# Patient Record
Sex: FEMALE | Race: WHITE | URBAN - METROPOLITAN AREA
[De-identification: names, ages, dates, MRNs, and addresses within clinical notes are randomized per-mention and may not be internally consistent; named-entity substitution may affect disease eponyms.]

---

## 2021-07-26 ENCOUNTER — LAB OUTSIDE AN ENCOUNTER (OUTPATIENT)
Dept: URBAN - METROPOLITAN AREA CLINIC 12 | Facility: CLINIC | Age: 61
End: 2021-07-26

## 2021-07-26 ENCOUNTER — DASHBOARD ENCOUNTERS (OUTPATIENT)
Age: 61
End: 2021-07-26

## 2021-07-26 ENCOUNTER — WEB ENCOUNTER (OUTPATIENT)
Dept: URBAN - METROPOLITAN AREA CLINIC 12 | Facility: CLINIC | Age: 61
End: 2021-07-26

## 2021-07-26 ENCOUNTER — OFFICE VISIT (OUTPATIENT)
Dept: URBAN - METROPOLITAN AREA CLINIC 12 | Facility: CLINIC | Age: 61
End: 2021-07-26
Payer: COMMERCIAL

## 2021-07-26 VITALS
DIASTOLIC BLOOD PRESSURE: 68 MMHG | TEMPERATURE: 96.8 F | HEIGHT: 67 IN | WEIGHT: 186.8 LBS | HEART RATE: 80 BPM | SYSTOLIC BLOOD PRESSURE: 107 MMHG | BODY MASS INDEX: 29.32 KG/M2

## 2021-07-26 DIAGNOSIS — Z12.11 COLON CANCER SCREENING: ICD-10-CM

## 2021-07-26 PROCEDURE — 99202 OFFICE O/P NEW SF 15 MIN: CPT | Performed by: STUDENT IN AN ORGANIZED HEALTH CARE EDUCATION/TRAINING PROGRAM

## 2021-07-26 RX ORDER — SODIUM SULFATE, MAGNESIUM SULFATE, AND POTASSIUM CHLORIDE 17.75; 2.7; 2.25 G/1; G/1; G/1
12 TABLETS TABLET ORAL
Qty: 24 TABLET | Refills: 0 | OUTPATIENT
Start: 2021-07-26 | End: 2021-07-27

## 2021-07-26 RX ORDER — LEVOTHYROXINE SODIUM 100 UG/1
1 TABLET IN THE MORNING ON AN EMPTY STOMACH TABLET ORAL ONCE A DAY
Status: ACTIVE | COMMUNITY

## 2021-07-26 NOTE — HPI-TODAY'S VISIT:
59 yo F here for evaluation prior to colonoscopy.  No prior colonoscopy.  She says that sometimes she will go 5-6 days without a bowel movement, which has been her baseline for her entire life. She denies sensation of constipation. Denies abdominal pain. Denies rectal bleeding. No known anemia.  No FH of CRC.

## 2021-08-24 ENCOUNTER — TELEPHONE ENCOUNTER (OUTPATIENT)
Dept: URBAN - METROPOLITAN AREA CLINIC 92 | Facility: CLINIC | Age: 61
End: 2021-08-24

## 2021-08-24 ENCOUNTER — OFFICE VISIT (OUTPATIENT)
Dept: URBAN - METROPOLITAN AREA LAB 3 | Facility: LAB | Age: 61
End: 2021-08-24
Payer: COMMERCIAL

## 2021-08-24 DIAGNOSIS — Z53.8 FAILED ATTEMPTED SURGICAL PROCEDURE: ICD-10-CM

## 2021-08-24 DIAGNOSIS — Z12.11 AVERAGE RISK FOR CRC. DUE TO PT'S CO-MORBID STATE WITH END STAGE DEMENTIA, HIGH RISK FOR ANESTHESIA (PER NEUROLOGY); INABILITY TO TAKE A BOWEL PREP....WOULD NOT ADVISE ANY COLORECTAL CANCER SCREENING INCLUDING STOOL TEST FOR FECAL BLOOD.: ICD-10-CM

## 2021-08-24 PROCEDURE — 45330 DIAGNOSTIC SIGMOIDOSCOPY: CPT | Performed by: STUDENT IN AN ORGANIZED HEALTH CARE EDUCATION/TRAINING PROGRAM

## 2021-08-24 RX ORDER — LEVOTHYROXINE SODIUM 100 UG/1
1 TABLET IN THE MORNING ON AN EMPTY STOMACH TABLET ORAL ONCE A DAY
Status: ACTIVE | COMMUNITY

## 2021-11-08 ENCOUNTER — TELEPHONE ENCOUNTER (OUTPATIENT)
Dept: URBAN - METROPOLITAN AREA CLINIC 12 | Facility: CLINIC | Age: 61
End: 2021-11-08

## 2021-11-08 RX ORDER — SODIUM PICOSULFATE, MAGNESIUM OXIDE, AND ANHYDROUS CITRIC ACID 10; 3.5; 12 MG/160ML; G/160ML; G/160ML
320ML LIQUID ORAL
Qty: 2 BOTTLES | Refills: 0 | OUTPATIENT
Start: 2021-11-08 | End: 2021-11-09

## 2021-11-08 RX ORDER — LEVOTHYROXINE SODIUM 100 UG/1
1 TABLET IN THE MORNING ON AN EMPTY STOMACH TABLET ORAL ONCE A DAY
Status: ACTIVE | COMMUNITY

## 2022-05-24 ENCOUNTER — CLAIMS CREATED FROM THE CLAIM WINDOW (OUTPATIENT)
Dept: URBAN - METROPOLITAN AREA LAB 3 | Facility: LAB | Age: 62
End: 2022-05-24

## 2022-05-24 ENCOUNTER — CLAIMS CREATED FROM THE CLAIM WINDOW (OUTPATIENT)
Dept: URBAN - METROPOLITAN AREA LAB 3 | Facility: LAB | Age: 62
End: 2022-05-24
Payer: COMMERCIAL

## 2022-05-24 ENCOUNTER — OFFICE VISIT (OUTPATIENT)
Dept: URBAN - METROPOLITAN AREA LAB 3 | Facility: LAB | Age: 62
End: 2022-05-24

## 2022-05-24 DIAGNOSIS — Z12.11 COLON CANCER SCREENING: ICD-10-CM

## 2022-05-24 DIAGNOSIS — K63.5 BENIGN COLON POLYP: ICD-10-CM

## 2022-05-24 PROCEDURE — 45380 COLONOSCOPY AND BIOPSY: CPT | Performed by: STUDENT IN AN ORGANIZED HEALTH CARE EDUCATION/TRAINING PROGRAM

## 2022-05-24 RX ORDER — LEVOTHYROXINE SODIUM 100 UG/1
1 TABLET IN THE MORNING ON AN EMPTY STOMACH TABLET ORAL ONCE A DAY
Status: ACTIVE | COMMUNITY

## 2023-10-31 ENCOUNTER — APPOINTMENT (RX ONLY)
Dept: URBAN - METROPOLITAN AREA OTHER 8 | Facility: OTHER | Age: 63
Setting detail: DERMATOLOGY
End: 2023-10-31

## 2023-10-31 DIAGNOSIS — L20.89 OTHER ATOPIC DERMATITIS: ICD-10-CM

## 2023-10-31 PROCEDURE — 99203 OFFICE O/P NEW LOW 30 MIN: CPT

## 2023-10-31 PROCEDURE — ? PRESCRIPTION MEDICATION MANAGEMENT

## 2023-10-31 PROCEDURE — ? COUNSELING

## 2023-10-31 PROCEDURE — ? PRESCRIPTION

## 2023-10-31 RX ORDER — CLOBETASOL PROPIONATE 0.5 MG/G
CREAM TOPICAL
Qty: 60 | Refills: 1 | Status: ERX | COMMUNITY
Start: 2023-10-31

## 2023-10-31 RX ORDER — TACROLIMUS 1 MG/G
OINTMENT TOPICAL
Qty: 100 | Refills: 3 | Status: ERX | COMMUNITY
Start: 2023-10-31

## 2023-10-31 RX ADMIN — CLOBETASOL PROPIONATE: 0.5 CREAM TOPICAL at 00:00

## 2023-10-31 RX ADMIN — TACROLIMUS: 1 OINTMENT TOPICAL at 00:00

## 2023-10-31 ASSESSMENT — LOCATION DETAILED DESCRIPTION DERM
LOCATION DETAILED: RIGHT MEDIAL SUPERIOR CHEST
LOCATION DETAILED: LEFT ANTERIOR DISTAL THIGH
LOCATION DETAILED: LEFT RIB CAGE
LOCATION DETAILED: RIGHT AXILLARY TAIL OF BREAST
LOCATION DETAILED: RIGHT PROXIMAL DORSAL FOREARM
LOCATION DETAILED: LEFT DISTAL DORSAL FOREARM
LOCATION DETAILED: LEFT PROXIMAL PRETIBIAL REGION
LOCATION DETAILED: RIGHT SUPRAPUBIC SKIN
LOCATION DETAILED: LEFT DISTAL CALF
LOCATION DETAILED: RIGHT LATERAL ABDOMEN
LOCATION DETAILED: LEFT BUTTOCK
LOCATION DETAILED: RIGHT INFRAMAMMARY CREASE (INNER QUADRANT)
LOCATION DETAILED: RIGHT BUTTOCK

## 2023-10-31 ASSESSMENT — LOCATION SIMPLE DESCRIPTION DERM
LOCATION SIMPLE: LEFT THIGH
LOCATION SIMPLE: LEFT FOREARM
LOCATION SIMPLE: LEFT BUTTOCK
LOCATION SIMPLE: LEFT CALF
LOCATION SIMPLE: RIGHT BUTTOCK
LOCATION SIMPLE: ABDOMEN
LOCATION SIMPLE: RIGHT FOREARM
LOCATION SIMPLE: GROIN
LOCATION SIMPLE: LEFT PRETIBIAL REGION
LOCATION SIMPLE: RIGHT BREAST
LOCATION SIMPLE: CHEST

## 2023-10-31 ASSESSMENT — LOCATION ZONE DERM
LOCATION ZONE: TRUNK
LOCATION ZONE: LEG
LOCATION ZONE: ARM

## 2023-10-31 ASSESSMENT — PAIN INTENSITY VAS: HOW INTENSE IS YOUR PAIN 0 BEING NO PAIN, 10 BEING THE MOST SEVERE PAIN POSSIBLE?: NO PAIN

## 2023-10-31 ASSESSMENT — SEVERITY ASSESSMENT 2020: SEVERITY 2020: MILD

## 2023-10-31 NOTE — PROCEDURE: PRESCRIPTION MEDICATION MANAGEMENT
Initiate Treatment: tacrolimus Apply to eczema affected areas twice daily while not using steroid cream.\\n\\nclobetasol Apply twice daily to affected areas x 2 weeks, repeat only on weekends.
Render In Strict Bullet Format?: No
Plan: Recheck in 3 months
Detail Level: Zone

## 2024-04-29 ENCOUNTER — RX ONLY (OUTPATIENT)
Age: 64
Setting detail: RX ONLY
End: 2024-04-29

## 2024-04-29 RX ORDER — CLOBETASOL PROPIONATE 0.5 MG/G
CREAM TOPICAL
Qty: 60 | Refills: 1 | Status: ERX

## 2024-11-21 NOTE — PHYSICAL EXAM MUSCULOSKELETAL:
Physical Therapy    Physical Therapy Treatment    Patient Name: Khalida Barker  MRN: [id number]  Today's Date: 11/22/2024    Time Entry:   Time Calculation  Start Time: 1119  Stop Time: 1215  Time Calculation (min): 56 min     PT Therapeutic Procedures Time Entry  Neuromuscular Re-Education Time Entry: 15  Therapeutic Exercise Time Entry: 41                 Visit #17 (5 of 10 new)   [employer] BMN PT OT COPAY 30 DED 0 COVERAGE 100 OOP 0 NO AUTH REQ, REF# [id number]     Assessment:   Pt tolerated session well without adverse effect and is making slow progress toward goals. Pt reports positive benefit to R ankle elastic therapeutic taping, and therefore re-applied today. Pt also with c/o R great toe & 1st metatarsal head pain, likely associated with hallux valgus. Provided pt with initial exercises to assist with this and discussed shoes with wider toe box and improved ankle support to address this. Pt adequately fatigued with R periscapular strengthening today. Was able to lift her arms with use of serratus anterior TB exercise, however continues to be extremely dominant with R upper trapezius, and frequent cues required to limit this compensation. Pt was able to maintain her balance today with fwd/bwd walking in // bars without device today with less retropulsion. She was able to perform her first community outing since her surgery by going to the grocery store with a friend, which went well. Continues to report she will have an upcoming wrist surgery, which may impact her ability to work on shoulder strengthening and walking with lesser device. Pt will continue to benefit from PT to address her functional mobility and balance as well as R shoulder function.     Plan:   Continue to apply Ktape to help R ankle DF.  Review new exercise of HEP: standing scapular retraction + shoulder extension with yellow TB, no monies with red TB, serratus anterior flexion with yellow TB loop.   Continue working  normal gait, normal range of motion "on shoulder/periscapular strengthening:   As able:   Wall slides- arms in pillow case  Wall slides with TB  Posterior deltoid row, emphasis on neutral scapular positioning    Gait training with lesser device & balance    Current Problem  1. Difficulty walking        2. Falls frequently        3. Right foot drop        4. Shoulder stiffness, right        5. Unsteadiness on feet          General   Pt arrives at session with her , Quinn, however attends session independently. She reports she had her covid vaccination yesterday in L arm which was pretty sore. Reports R side big toe is painful. This occurred in acute rehab as well. Reports her toe is even sensitive to touch on her sheets. Wonders if this is due to swelling after eating more pork lately. Wore wider shoes to accommodate her R toe.   Had a grocery store and used a small grocery cart as a device. Went pretty well. Was able to make ~50 minutes in the store.        Subjective    Precautions  Precautions  STEADI Fall Risk Score (The score of 4 or more indicates an increased risk of falling): 9  Vital Signs     Pain  Pain Assessment  Pain Assessment: 0-10  0-10 (Numeric) Pain Score: 0 - No pain    Objective   Shoulder ROM:   AROM elevation: 40 deg   AAROM:   R= 140 deg   L= 155 deg     Treatments:  Therapeutic Exercise (96974): 41 minutes  Objective measures, as above.     Discussed with pt exercises to address her R hallux valgus, including placing cotton ball between toes for toe spacers when at rest, as well as rubber band around great toe and moving feet outward laterally.   Educated pt on importance of wider toe box to prevent worsening of hallux valgus. Also discussed shoes with higher ankle/foot support to prevent from \"walking out the door\".       Scapular retraction + GHJ ER with red TB 3x10 (added today)   Shoulder flexion with yellow looped band resistance for serratus anterior 3x10 (added today)     NOT TODAY:   Supine horizontal ABD + scaption " "with yellow TB 3x10 reps  Supine horizontal ABD with yellow TB 2x10 reps  Supine scapular protraction/retraction, first with cane, then progressing to 4# weighted bar. Tactile cues over R SA to improve force of contraction. 3x10    Verbally reviewed standing rows with yellow TB 3x10, tactile cues of therapist to assist with full scapular excursion       Current HEP:  Pulleys flexion/scaption in sitting  Supine AAROM flexion with dowel rogelio   Shoulder extension + scapular retraction with yellow TB   Scapular retraction + GHJ ER with red TB (added today)   Shoulder flexion with yellow looped TB for serratus activation (added today)        From [facility] HEP:   Seated marching  Seated hip ABD with TB   Side stepping seated  Standing squats  Side stepping standing  Standing hip flexion  Standing hip EXT   Standing HS curl  Arm lifts with 1# weight  Comments: does have adjustable ankle weights up to 5#. Currently using a 1# ankle weight    Access Code: [email or web address]  URL: [email or web address]  Date: 11/22/2024  Prepared by: Lara Hsieh    Exercises  - Shoulder External Rotation and Scapular Retraction with Resistance  - 1-2 x daily - 7 x weekly - 3 sets - 10 reps - 2 seconds hold  - Shoulder Flexion Serratus Activation with Resistance  - 1-3 x daily - 7 x weekly - 2 sets - 10 reps    Neuromuscular Re-education (13423): 15 minutes  Elastic therapeutic tape applied to pt's R ankle for ankle DF facilitation. \"I\" strip applied with no tension at end anchors, with paper off tension in therapeutic zone. Anchors applied at base of 2-3rd metatarsals & at anterior lateral aspect of shin. Anchors applied with R ankle in DF, followed by therapeutic zone of tape applied in ankle PF.   Instructed pt in precautions/contraindications, and grounds to discontinue use.      Walking fwd/bwd in // bars without device, multiple laps. Decreased retropulsion today. Cues to widen MARLA.    AP weight shifting drill:   --Pt stood " holding dowel rogelio pushing small cone fwd on elevated end table. Performs without LOB. Flexes knees slightly.     OP EDUCATION:       Goals:  By discharge, pt will meet the following goals:      Pt will demo decrease in TUG score by 5 to decrease fall risk.--PARTIALLY MET  Pt will demo decrease in BBS score by 9 to decrease fall risk--NOT TESTED TODAY  Pt will increase BL LE strengthen to 4+/5 in order to improve functional activities.--PARTIALLY MET  Pt will increase R UE AROM for elevation to WFL & within 10 deg of contralateral side.   Pt will ambulate with LRAD- preferably a cane- 350ft+ MOD I.